# Patient Record
Sex: FEMALE | Race: WHITE
[De-identification: names, ages, dates, MRNs, and addresses within clinical notes are randomized per-mention and may not be internally consistent; named-entity substitution may affect disease eponyms.]

---

## 2020-07-17 ENCOUNTER — HOSPITAL ENCOUNTER (EMERGENCY)
Dept: HOSPITAL 26 - MED | Age: 40
Discharge: HOME | End: 2020-07-17
Payer: MEDICAID

## 2020-07-17 VITALS — WEIGHT: 131 LBS | HEIGHT: 63 IN | BODY MASS INDEX: 23.21 KG/M2

## 2020-07-17 VITALS — SYSTOLIC BLOOD PRESSURE: 145 MMHG | DIASTOLIC BLOOD PRESSURE: 85 MMHG

## 2020-07-17 VITALS — SYSTOLIC BLOOD PRESSURE: 151 MMHG | DIASTOLIC BLOOD PRESSURE: 90 MMHG

## 2020-07-17 DIAGNOSIS — G43.909: Primary | ICD-10-CM

## 2020-07-17 PROCEDURE — 99284 EMERGENCY DEPT VISIT MOD MDM: CPT

## 2020-07-17 PROCEDURE — 96375 TX/PRO/DX INJ NEW DRUG ADDON: CPT

## 2020-07-17 PROCEDURE — 96374 THER/PROPH/DIAG INJ IV PUSH: CPT

## 2020-07-17 NOTE — NUR
39 F BIB SELF FOR C/C OF 8/10 MIGRAINE HA SINCE 5PM LAST EVENING. PT STATES SHE 
IS EXPERIENCING NAUSEA WITH VOMITING, BLURRY VISION, AND NECK PAIN. PT STATES 
SHE TOOK TWO VICODIN AT 0000, UNKNOWN HOW MANY MG SHE TOOK, WITH NO RELIEF OF 
SYMPTOMS. 



NKA

MED HX: VIT D DEFICIENCY, ANEMIA, DEPRESSION